# Patient Record
Sex: MALE | Race: WHITE | Employment: OTHER | URBAN - METROPOLITAN AREA
[De-identification: names, ages, dates, MRNs, and addresses within clinical notes are randomized per-mention and may not be internally consistent; named-entity substitution may affect disease eponyms.]

---

## 2019-04-17 ENCOUNTER — APPOINTMENT (OUTPATIENT)
Dept: GENERAL RADIOLOGY | Age: 49
End: 2019-04-17

## 2019-04-17 ENCOUNTER — HOSPITAL ENCOUNTER (EMERGENCY)
Age: 49
Discharge: HOME | End: 2019-04-17

## 2019-04-17 VITALS
DIASTOLIC BLOOD PRESSURE: 95 MMHG | RESPIRATION RATE: 16 BRPM | WEIGHT: 230 LBS | HEIGHT: 71 IN | HEART RATE: 80 BPM | TEMPERATURE: 97.6 F | BODY MASS INDEX: 32.2 KG/M2 | OXYGEN SATURATION: 96 % | SYSTOLIC BLOOD PRESSURE: 142 MMHG

## 2019-04-17 DIAGNOSIS — M25.461 KNEE EFFUSION, RIGHT: Primary | ICD-10-CM

## 2019-04-17 PROCEDURE — L1830 KO IMMOB CANVAS LONG PRE OTS: HCPCS

## 2019-04-17 PROCEDURE — 73562 X-RAY EXAM OF KNEE 3: CPT

## 2019-04-17 PROCEDURE — 99283 EMERGENCY DEPT VISIT LOW MDM: CPT

## 2019-04-17 PROCEDURE — 6370000000 HC RX 637 (ALT 250 FOR IP): Performed by: PHYSICIAN ASSISTANT

## 2019-04-17 RX ORDER — HYDROCODONE BITARTRATE AND ACETAMINOPHEN 5; 325 MG/1; MG/1
1 TABLET ORAL ONCE
Status: COMPLETED | OUTPATIENT
Start: 2019-04-17 | End: 2019-04-17

## 2019-04-17 RX ORDER — PREDNISONE 10 MG/1
60 TABLET ORAL DAILY
Qty: 30 TABLET | Refills: 0 | Status: SHIPPED | OUTPATIENT
Start: 2019-04-17 | End: 2019-04-22

## 2019-04-17 RX ORDER — IBUPROFEN 800 MG/1
800 TABLET ORAL EVERY 8 HOURS PRN
Qty: 20 TABLET | Refills: 0 | Status: SHIPPED | OUTPATIENT
Start: 2019-04-17

## 2019-04-17 RX ADMIN — HYDROCODONE BITARTRATE AND ACETAMINOPHEN 1 TABLET: 5; 325 TABLET ORAL at 20:59

## 2019-04-17 ASSESSMENT — PAIN DESCRIPTION - ORIENTATION: ORIENTATION: RIGHT

## 2019-04-17 ASSESSMENT — ENCOUNTER SYMPTOMS: RESPIRATORY NEGATIVE: 1

## 2019-04-17 ASSESSMENT — PAIN DESCRIPTION - LOCATION: LOCATION: KNEE

## 2019-04-17 ASSESSMENT — PAIN DESCRIPTION - PAIN TYPE: TYPE: ACUTE PAIN

## 2019-04-17 ASSESSMENT — PAIN SCALES - GENERAL: PAINLEVEL_OUTOF10: 9

## 2019-04-18 NOTE — ED PROVIDER NOTES
atraumatic. Nose: Nose normal.   Eyes: Right eye exhibits no discharge. Left eye exhibits no discharge. Neck: Normal range of motion. Neck supple. Pulmonary/Chest: Effort normal. No respiratory distress. Musculoskeletal: He exhibits no deformity. Right knee: He exhibits decreased range of motion, swelling and effusion. He exhibits normal alignment, no LCL laxity, no bony tenderness, normal meniscus and no MCL laxity. Tenderness found. Medial joint line and lateral joint line tenderness noted. No MCL, no LCL and no patellar tendon tenderness noted. Neurovascularly intact. Neurological: He is alert and oriented to person, place, and time. Skin: Skin is warm, dry and intact. Capillary refill takes less than 2 seconds. He is not diaphoretic. Knee is not erythematous, warm to touch or angry appearing. No evidence of acute infection on exam. Sensation intact. Psychiatric: He has a normal mood and affect. His behavior is normal.   Nursing note and vitals reviewed. MEDICAL DECISION MAKING    Vitals:    Vitals:    04/17/19 1937 04/17/19 2030   BP: (!) 150/96 (!) 142/95   Pulse: 90 80   Resp: 18 16   Temp: 97.7 °F (36.5 °C) 97.6 °F (36.4 °C)   TempSrc: Oral Oral   SpO2: 98% 96%   Weight: 230 lb (104.3 kg)    Height: 5' 11\" (1.803 m)        LABS:Labs Reviewed - No data to display     Remainder of labs reviewed and werenegative at this time or not returned at the time of this note. RADIOLOGY:   Non-plain film images such as CT, Ultrasound and MRI are read by the radiologist. Beata Miner PA-C have directly visualized the radiologic plain film image(s) with the below findings:        Interpretation per the Radiologist below, if available at the time of thisnote:    XR KNEE RIGHT (3 VIEWS)   Final Result   Large joint effusion. In the absence of known trauma, differential   considerations include degenerative internal derangement and inflammatory or   infectious arthritis.   If there is

## 2019-04-18 NOTE — ED NOTES
Knee immobilizer applied to patient's right knee. Patient fitted for crutches and instructed on proper use. Patient return demonstrated. Patient has no further questions at this time.       Neto Fuentes  04/17/19 9153

## 2019-04-18 NOTE — ED TRIAGE NOTES
Chief Complaint   Patient presents with    Knee Pain     woke up this morning with right knee pain and swelling

## 2022-09-07 ENCOUNTER — HOSPITAL ENCOUNTER (EMERGENCY)
Age: 52
Discharge: HOME | End: 2022-09-07
Payer: MEDICAID

## 2022-09-07 VITALS
OXYGEN SATURATION: 96 % | WEIGHT: 264 LBS | BODY MASS INDEX: 37.8 KG/M2 | DIASTOLIC BLOOD PRESSURE: 108 MMHG | TEMPERATURE: 98.3 F | HEART RATE: 68 BPM | RESPIRATION RATE: 16 BRPM | SYSTOLIC BLOOD PRESSURE: 171 MMHG | HEIGHT: 70 IN

## 2022-09-07 DIAGNOSIS — K02.9 DENTAL CARIES: Primary | ICD-10-CM

## 2022-09-07 DIAGNOSIS — K08.89 PAIN, DENTAL: ICD-10-CM

## 2022-09-07 PROCEDURE — 99283 EMERGENCY DEPT VISIT LOW MDM: CPT

## 2022-09-07 PROCEDURE — 6370000000 HC RX 637 (ALT 250 FOR IP): Performed by: NURSE PRACTITIONER

## 2022-09-07 RX ORDER — NAPROXEN 250 MG/1
250 TABLET ORAL ONCE
Status: COMPLETED | OUTPATIENT
Start: 2022-09-07 | End: 2022-09-07

## 2022-09-07 RX ORDER — PENICILLIN V POTASSIUM 500 MG/1
500 TABLET ORAL 4 TIMES DAILY
Qty: 40 TABLET | Refills: 0 | Status: SHIPPED | OUTPATIENT
Start: 2022-09-07 | End: 2022-09-17

## 2022-09-07 RX ORDER — LIDOCAINE HYDROCHLORIDE 20 MG/ML
5 SOLUTION OROPHARYNGEAL ONCE
Status: COMPLETED | OUTPATIENT
Start: 2022-09-07 | End: 2022-09-07

## 2022-09-07 RX ORDER — LIDOCAINE HYDROCHLORIDE 20 MG/ML
15 SOLUTION OROPHARYNGEAL EVERY 8 HOURS PRN
Qty: 1 EACH | Refills: 0 | Status: SHIPPED | OUTPATIENT
Start: 2022-09-07 | End: 2022-09-12

## 2022-09-07 RX ORDER — CHLORHEXIDINE GLUCONATE 0.12 MG/ML
15 RINSE ORAL 2 TIMES DAILY
Qty: 420 ML | Refills: 0 | Status: SHIPPED | OUTPATIENT
Start: 2022-09-07 | End: 2022-09-21

## 2022-09-07 RX ORDER — NAPROXEN 250 MG/1
250 TABLET ORAL 2 TIMES DAILY WITH MEALS
Qty: 20 TABLET | Refills: 1 | Status: SHIPPED | OUTPATIENT
Start: 2022-09-07

## 2022-09-07 RX ORDER — PENICILLIN V POTASSIUM 250 MG/1
500 TABLET ORAL ONCE
Status: COMPLETED | OUTPATIENT
Start: 2022-09-07 | End: 2022-09-07

## 2022-09-07 RX ADMIN — NAPROXEN 250 MG: 250 TABLET ORAL at 13:32

## 2022-09-07 RX ADMIN — LIDOCAINE HYDROCHLORIDE 5 ML: 20 SOLUTION ORAL; TOPICAL at 13:32

## 2022-09-07 RX ADMIN — PENICILLIN V POTASSIUM 500 MG: 250 TABLET, FILM COATED ORAL at 13:32

## 2022-09-07 ASSESSMENT — ENCOUNTER SYMPTOMS
COLOR CHANGE: 0
SORE THROAT: 0
ABDOMINAL PAIN: 0
FACIAL SWELLING: 0
SHORTNESS OF BREATH: 0
RHINORRHEA: 0

## 2022-09-07 ASSESSMENT — PAIN DESCRIPTION - LOCATION: LOCATION: JAW;TEETH

## 2022-09-07 ASSESSMENT — PAIN SCALES - GENERAL
PAINLEVEL_OUTOF10: 10
PAINLEVEL_OUTOF10: 10

## 2022-09-07 ASSESSMENT — PAIN - FUNCTIONAL ASSESSMENT: PAIN_FUNCTIONAL_ASSESSMENT: 0-10

## 2022-09-07 ASSESSMENT — PAIN DESCRIPTION - DESCRIPTORS: DESCRIPTORS: OTHER (COMMENT)

## 2022-09-07 ASSESSMENT — PAIN DESCRIPTION - ORIENTATION: ORIENTATION: LEFT;UPPER

## 2022-09-07 NOTE — ED PROVIDER NOTES
Evaluated by [id number] [facility] Provider          [address] [facility]  EMERGENCY DEPARTMENT ENCOUNTER        Pt Name: Rajiv Richardson  MRN: [id number]  [place] 1970  Dateof evaluation: 9/7/2022  Provider: YESSENIA Rodriguez CNP  PCP: No primary care provider on file. ED Attending: No att. providers found    [facility]       Chief Complaint   Patient presents with    Dental Pain     Front two teeth of upper left mouth broken. Swelling and pain reported by pt for two days. Pt states pain in his sinuses since today. HISTORY OF PRESENTILLNESS   (Location/Symptom, Timing/Onset, Context/Setting, Quality, Duration, Modifying Factors, Severity)  Note limiting factors. Rajiv Richardson is a 46 y.o. male for tooth pain. Onset was 2 days. Context includes patient reports that he has had pain to the front teeth for the last 2 days. Patient reports that he is called multiple clinics and they have told him to go to the ED. Patient denies any fever. He denies any smoking. He reports his tetanus is up-to-date denies any fevers. Alleviating factors include nothing. Aggravating factors include nothing. Pain is 10/10. Nothing has been used for pain today. Nursing Notes were all reviewed and agreed with or any disagreements were addressed  in the HPI. REVIEW OF SYSTEMS    (2-9 systems for level 4, 10 or more for level 5)     Review of Systems   Constitutional:  Negative for activity change, appetite change and fever. HENT:  Positive for dental problem. Negative for congestion, facial swelling, rhinorrhea and sore throat. Eyes:  Negative for visual disturbance. Respiratory:  Negative for shortness of breath. Cardiovascular:  Negative for chest pain. Gastrointestinal:  Negative for abdominal pain. Genitourinary:  Negative for difficulty urinating. Musculoskeletal:  Negative for arthralgias and myalgias. Skin:  Negative for color change and rash.    Neurological:  Negative for dizziness and light-headedness. Psychiatric/Behavioral:  Negative for agitation. All other systems reviewed and are negative. Positives and Pertinent negatives as per HPI. Except as noted above in the ROS, all other systems were reviewed and negative. PAST MEDICAL HISTORY     Past Medical History:   Diagnosis Date    HTN (hypertension)          SURGICAL HISTORY       Past Surgical History:   Procedure Laterality Date    KNEE SURGERY  2005    left ACL         CURRENT MEDICATIONS       Discharge Medication List as of 9/7/2022  1:26 PM        CONTINUE these medications which have NOT CHANGED    Details   ibuprofen (IBU) 800 MG tablet Take 1 tablet by mouth every 8 hours as needed for Pain, Disp-20 tablet, R-0Print               ALLERGIES     Patient has no known allergies. FAMILY HISTORY       Family History   Problem Relation Age of Onset    Cancer Mother           SOCIAL HISTORY       Social History     Socioeconomic History    Marital status:      Spouse name: None    Number of children: None    Years of education: None    Highest education level: None   Tobacco Use    Smoking status: Never    Smokeless tobacco: Never   Vaping Use    Vaping Use: Never used   Substance and Sexual Activity    Alcohol use: No    Drug use: No       SCREENINGS    Kit Coma Scale  Eye Opening: Spontaneous  Best Verbal Response: Oriented  Best Motor Response: Obeys commands  [place] Coma Scale Score: 15        PHYSICAL EXAM  (up to 7 for level 4, 8 or more for level 5)     ED Triage Vitals [09/07/22 1222]   BP Temp Temp Source Heart Rate Resp SpO2 Height Weight   (!) 169/96 98.3 °F (36.8 °C) Oral 72 16 97 % 5' 10\" (1.778 m) 264 lb (119.7 kg)       Physical Exam  Constitutional:       Appearance: Normal appearance. He is well-developed. He is obese. HENT:      Head: Normocephalic and atraumatic. Mouth/Throat:     Cardiovascular:      Rate and Rhythm: Normal rate.    Pulmonary:      Effort: Pulmonary effort is normal. No respiratory distress. Abdominal:      General: There is no distension. Palpations: Abdomen is soft. Tenderness: There is no abdominal tenderness. Musculoskeletal:         General: Normal range of motion. Cervical back: Normal range of motion. Skin:     General: Skin is warm and dry. Neurological:      Mental Status: He is alert and oriented to person, place, and time. DIAGNOSTIC RESULTS   LABS:    Labs Reviewed - No data to display    All other labs werewithin normal range or not returned as of this dictation. EKG: All EKG's are interpreted by the Emergency Department Physician who either signs or Co-signs this chart in the absence of a cardiologist.  Please see their note for interpretation of EKG. RADIOLOGY:           Interpretation per the Radiologist below, if available at the time of this note:    No orders to display     No results found. PROCEDURES   Unless otherwise noted below, none     Procedures     CRITICAL CARE TIME   N/A    CONSULTS:  None      EMERGENCYDEPARTMENT COURSE and DIFFERENTIAL DIAGNOSIS/MDM:   Vitals:    Vitals:    09/07/22 1222 09/07/22 1331   BP: (!) 169/96 (!) 171/108   Pulse: 72 68   Resp: 16 16   Temp: 98.3 °F (36.8 °C)    TempSrc: Oral    SpO2: 97% 96%   Weight: 264 lb (119.7 kg)    Height: 5' 10\" (1.778 m)        Patient was given the following medications:  Medications   penicillin v potassium (VEETID) tablet 500 mg (500 mg Oral Given 9/7/22 1332)   naproxen (NAPROSYN) tablet 250 mg (250 mg Oral Given 9/7/22 1332)   lidocaine viscous hcl (XYLOCAINE) 2 % solution 5 mL (5 mLs Mouth/Throat Given 9/7/22 1332)       Patient was seen and evaluated by myself. Patient here for concerns for dental pain. Patient reports over the last 2 days he has had dental pain. Patient denies any fever. He states his tetanus is up-to-date and denies any smoking history.   On exam he is awake and alert hemodynamically stable nontoxic in appearance. Patient does have extensive dental decay noted. There is no abscess or concern for Bill's. Patient was given penicillin Naprosyn and viscous lidocaine here in the ED. He was encouraged to return to the ED for worsening symptoms and to follow-up with his primary care doctor and the dental referral that was provided patient was discharged with prescriptions. He was discharged with all questions answered. Is this patient to be included in the SEP-1 Core Measure due to severe sepsis or septic shock? No   Exclusion criteria - the patient is NOT to be included for SEP-1 Core Measure due to: Infection is not suspected       The patient tolerated their visit well. I have evaluated this patient. My supervising physician was available for consultation. The patient and / or the family were informed of the results of any tests, a time was given to answer questions, a plan was proposed and they agreed with plan. FINAL IMPRESSION      1. Dental caries    2.  Pain, dental          DISPOSITION/PLAN   DISPOSITION Decision To Discharge 09/07/2022 01:13:12 PM      PATIENT REFERRED TO:  [facility] ED  [address] [facility]  [phone]    If symptoms worsen    see the list      to establish dental care    [facility]) Pre-Services  [phone]        DISCHARGE MEDICATIONS:  Discharge Medication List as of 9/7/2022  1:26 PM        START taking these medications    Details   penicillin v potassium (VEETID) 500 MG tablet Take 1 tablet by mouth 4 times daily for 10 days, Disp-40 tablet, R-0Print      naproxen (NAPROSYN) 250 MG tablet Take 1 tablet by mouth 2 times daily (with meals), Disp-20 tablet, R-1Print      chlorhexidine (PERIDEX) 0.12 % solution Take 15 mLs by mouth 2 times daily for 14 days, Disp-420 mL, R-0Print      lidocaine viscous hcl (XYLOCAINE) 2 % SOLN solution Take 15 mLs by mouth every 8 hours as needed for Irritation (swish and spit), Disp-1 each, R-0Print             DISCONTINUED MEDICATIONS:  Discharge Medication List as of 9/7/2022  1:26 PM                 (Please note that portions of this note were completed with a voice recognition program.  Efforts were made to edit the dictations but occasionally words are mis-transcribed.)    YESSENIA Chau CNP (electronically signed)         YESSENIA Chau CNP  09/07/22 9957

## 2022-09-07 NOTE — DISCHARGE INSTRUCTIONS
Dental Emergency Referrals    [address] residents only)    [facility] AT [place]  [address] [phone]   [facility].  [phone]   [address] [facility]  [address]  [phone]   [facility] [place]  (entrance on [address] [facility]. [address] [place] [address].)  [address]  [phone]   [facility] [address].  [phone]   [facility]  [address] [place] [address].  [phone] [address] [facility]  [address] [facility].  [phone]   [facility]  [address]  [phone]     [facility] MEDICAL [place]  [facility] 2nd floor  [phone]   [facility]  [address] [phone] ([address] [facility])  [place]: [address]: [address]  [phone]   [address] [facility]/ [address]  [phone]     [facility]  [address]  [phone]   [address]  [address] [facility].  Oral Surgery Dept: [phone]  Dental Clinic: [address] [facility]  [phone]     [address] [facility] [address] [phone]   [facility]   [address] [place] [place], [address]  [place] [place] : [address] : [phone]     [facility]  [address]    Other [address] in the area    [address] [facility] (Dental Urgent Care)  [facility]  (Across from COMMUNITY MEDICAL [facility], [address]  [phone]?       Pediatric Only Dentists    [address]Third Floor   Up to age 21  [address]  Up to age 24  David: [facility]   [phone] or [address] [facility] [address]: [address] [place] [address]  Up to age 14  [address] [phone]

## 2023-09-29 ENCOUNTER — HOSPITAL ENCOUNTER (EMERGENCY)
Age: 53
Discharge: HOME | End: 2023-09-29
Payer: MEDICAID

## 2023-09-29 VITALS
SYSTOLIC BLOOD PRESSURE: 173 MMHG | OXYGEN SATURATION: 97 % | RESPIRATION RATE: 17 BRPM | DIASTOLIC BLOOD PRESSURE: 117 MMHG | BODY MASS INDEX: 39.31 KG/M2 | HEART RATE: 96 BPM | WEIGHT: 274 LBS | TEMPERATURE: 97.8 F

## 2023-09-29 DIAGNOSIS — L25.5 PLANT DERMATITIS: Primary | ICD-10-CM

## 2023-09-29 PROCEDURE — 99283 EMERGENCY DEPT VISIT LOW MDM: CPT

## 2023-09-29 PROCEDURE — 6360000002 HC RX W HCPCS: Performed by: PHYSICIAN ASSISTANT

## 2023-09-29 RX ORDER — METHYLPREDNISOLONE 4 MG/1
TABLET ORAL
Qty: 1 KIT | Refills: 0 | Status: SHIPPED | OUTPATIENT
Start: 2023-09-29 | End: 2023-10-05

## 2023-09-29 RX ORDER — DEXAMETHASONE 4 MG/1
10 TABLET ORAL ONCE
Status: COMPLETED | OUTPATIENT
Start: 2023-09-29 | End: 2023-09-29

## 2023-09-29 RX ADMIN — DEXAMETHASONE 10 MG: 4 TABLET ORAL at 17:51

## 2023-09-29 ASSESSMENT — PAIN - FUNCTIONAL ASSESSMENT
PAIN_FUNCTIONAL_ASSESSMENT: 0-10
PAIN_FUNCTIONAL_ASSESSMENT: 0-10

## 2023-09-29 ASSESSMENT — PAIN SCALES - GENERAL
PAINLEVEL_OUTOF10: 5
PAINLEVEL_OUTOF10: 5

## 2023-09-29 NOTE — ED NOTES
Pt scripts x1 instructed to follow up with PCP. Assessed per Perry PEREZ.      Ishaan Milan, OLAMIDE  54/87/83 4026